# Patient Record
Sex: FEMALE | Race: WHITE | NOT HISPANIC OR LATINO | Employment: PART TIME | URBAN - METROPOLITAN AREA
[De-identification: names, ages, dates, MRNs, and addresses within clinical notes are randomized per-mention and may not be internally consistent; named-entity substitution may affect disease eponyms.]

---
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
## 2023-12-28 ENCOUNTER — OFFICE VISIT (OUTPATIENT)
Dept: URGENT CARE | Facility: CLINIC | Age: 18
End: 2023-12-28
Payer: MEDICAID

## 2023-12-28 VITALS
SYSTOLIC BLOOD PRESSURE: 118 MMHG | RESPIRATION RATE: 18 BRPM | HEIGHT: 62 IN | HEART RATE: 90 BPM | OXYGEN SATURATION: 100 % | BODY MASS INDEX: 23.92 KG/M2 | TEMPERATURE: 99 F | DIASTOLIC BLOOD PRESSURE: 76 MMHG | WEIGHT: 130 LBS

## 2023-12-28 DIAGNOSIS — U07.1 COVID: Primary | ICD-10-CM

## 2023-12-28 DIAGNOSIS — J00 ACUTE RHINITIS: ICD-10-CM

## 2023-12-28 LAB
CTP QC/QA: YES
MOLECULAR STREP A: NEGATIVE
POC MOLECULAR INFLUENZA A AGN: NEGATIVE
POC MOLECULAR INFLUENZA B AGN: NEGATIVE
SARS-COV-2 RDRP RESP QL NAA+PROBE: POSITIVE

## 2023-12-28 PROCEDURE — 99214 OFFICE O/P EST MOD 30 MIN: CPT | Mod: PBBFAC | Performed by: NURSE PRACTITIONER

## 2023-12-28 PROCEDURE — 87635 SARS-COV-2 COVID-19 AMP PRB: CPT | Mod: PBBFAC | Performed by: NURSE PRACTITIONER

## 2023-12-28 PROCEDURE — 87502 INFLUENZA DNA AMP PROBE: CPT | Mod: PBBFAC | Performed by: NURSE PRACTITIONER

## 2023-12-28 PROCEDURE — 99204 PR OFFICE/OUTPT VISIT, NEW, LEVL IV, 45-59 MIN: ICD-10-PCS | Mod: S$PBB,,, | Performed by: NURSE PRACTITIONER

## 2023-12-28 PROCEDURE — 99204 OFFICE O/P NEW MOD 45 MIN: CPT | Mod: S$PBB,,, | Performed by: NURSE PRACTITIONER

## 2023-12-28 PROCEDURE — 87651 STREP A DNA AMP PROBE: CPT | Mod: PBBFAC | Performed by: NURSE PRACTITIONER

## 2023-12-28 RX ORDER — FLUTICASONE PROPIONATE 50 MCG
2 SPRAY, SUSPENSION (ML) NASAL DAILY
Qty: 18.2 ML | Refills: 0 | Status: SHIPPED | OUTPATIENT
Start: 2023-12-28

## 2023-12-28 RX ORDER — LORATADINE 10 MG/1
10 TABLET ORAL DAILY
Qty: 30 TABLET | Refills: 0 | Status: SHIPPED | OUTPATIENT
Start: 2023-12-28 | End: 2024-01-27

## 2023-12-28 NOTE — PROGRESS NOTES
"Subjective:      Patient ID: Vasile Paredes is a 18 y.o. female.    Vitals:  height is 5' 2" (1.575 m) and weight is 59 kg (130 lb). Her oral temperature is 98.6 °F (37 °C). Her blood pressure is 118/76 and her pulse is 90. Her respiration is 18 and oxygen saturation is 100%.     Chief Complaint: flu like symptoms (Patient reports body aches, sore throat, congestion, runny nose, HA, chills x 2 days)    HPI As stated in chief complaint. Pt also c/o developing an intermittent cough today .  Patient denies taking any medication for treatment of symptoms.  Pt denies , chest pain, shortness of breath, dizziness, abdominal pain, n/v/d,    ROS   Objective:     Physical Exam   Constitutional: She is oriented to person, place, and time. She appears well-developed.  Non-toxic appearance. She does not appear ill. No distress.   HENT:   Head: Normocephalic and atraumatic.   Ears:   Right Ear: Tympanic membrane normal.   Left Ear: Tympanic membrane normal.   Nose: Rhinorrhea and congestion present. No purulent discharge. Right sinus exhibits no maxillary sinus tenderness and no frontal sinus tenderness. Left sinus exhibits no maxillary sinus tenderness and no frontal sinus tenderness.   Mouth/Throat: Uvula is midline. No oropharyngeal exudate or posterior oropharyngeal erythema.   Eyes: Conjunctivae are normal. Right eye exhibits no discharge. Left eye exhibits no discharge. Extraocular movement intact   Neck: Neck supple. No neck rigidity present.   Cardiovascular: Regular rhythm.   Pulmonary/Chest: Effort normal and breath sounds normal. No respiratory distress. She has no wheezes. She has no rales.   Abdominal: Normal appearance and bowel sounds are normal. Soft.   Lymphadenopathy:     She has no cervical adenopathy.   Neurological: She is alert and oriented to person, place, and time.   Skin: Skin is warm, dry and not diaphoretic. Capillary refill takes less than 2 seconds.   Psychiatric: Her behavior is normal. Mood, " judgment and thought content normal.   Nursing note and vitals reviewed.      Assessment:     1. COVID    2. Acute rhinitis      Results for orders placed or performed in visit on 12/28/23   POCT Strep A, Molecular   Result Value Ref Range    Molecular Strep A, POC Negative Negative     Acceptable Yes    POCT Influenza A/B MOLECULAR   Result Value Ref Range    POC Molecular Influenza A Ag Negative Negative, Not Reported    POC Molecular Influenza B Ag Negative Negative, Not Reported     Acceptable Yes    POCT COVID-19 Rapid Screening   Result Value Ref Range    POC Rapid COVID Positive (A) Negative     Acceptable Yes          Plan:   ER per cautions given and discussed also discussed complications of a respiratory illness such as COVID.  Instructed on symptomatic treatment with medications as prescribed and when to seek ER evaluation for worsening condition.  - OTC cold/flu products as desired for symptoms  - Plenty of fluids  - Humidified air- Saline Nasal  lavages   -Warm salt water gargles to your throat  - Tylenol or Motrin for pain/fever    I recommend a 7 day quarantine from day of symptom onset.   COVID is contagious for an average of EIGHT DAYS, starting from Day 1 that you have symptoms.  You can spread COVID 2-3 days before you have symptoms.  The CDC permits 5 days of quarantine. If you choose to quarantine for 5 days, wear a hospital-grade, surgical mask over your nose and mouth when around other people and in public through day 8.  A cloth mask provides no protection from spreading or angelina COVID.  COVID  -     fluticasone propionate (FLONASE) 50 mcg/actuation nasal spray; 2 sprays (100 mcg total) by Each Nostril route once daily.  Dispense: 18.2 mL; Refill: 0  -     loratadine (CLARITIN) 10 mg tablet; Take 1 tablet (10 mg total) by mouth once daily.  Dispense: 30 tablet; Refill: 0    Acute rhinitis  -     POCT Strep A, Molecular  -     POCT Influenza  A/B MOLECULAR  -     POCT COVID-19 Rapid Screening  -     fluticasone propionate (FLONASE) 50 mcg/actuation nasal spray; 2 sprays (100 mcg total) by Each Nostril route once daily.  Dispense: 18.2 mL; Refill: 0  -     loratadine (CLARITIN) 10 mg tablet; Take 1 tablet (10 mg total) by mouth once daily.  Dispense: 30 tablet; Refill: 0

## 2023-12-28 NOTE — LETTER
December 28, 2023      [facility]  [facility] [address]  Phone: [phone]       Patient: Vasile Paredes   YOB: 2005  Date of Visit: 12/28/2023    To Whom It May Concern:    Darby Paredes  was at [facility] on 12/28/2023. The patient may return to work/school on 01/02/23 with no restrictions. If you have any questions or concerns, or if I can be of further assistance, please do not hesitate to contact me.    Sincerely,    JERMAINE Busby

## 2023-12-28 NOTE — PATIENT INSTRUCTIONS
Please follow instructions on patient education material.      Return to urgent care in 2 to 3 days if symptoms are not improving, immediately if you develop any new or worsening symptoms.     - OTC cold/flu products as desired for symptoms  - Plenty of fluids  - Humidified air  - Saline Nasal  lavages   -Warm salt water gargles to your throat  - Tylenol or Motrin for pain/fever    I recommend a 7 day quarantine from day of symptom onset.   COVID is contagious for an average of EIGHT DAYS, starting from Day 1 that you have symptoms.  You can spread COVID 2-3 days before you have symptoms.  The [facility] permits 5 days of quarantine. If you choose to quarantine for 5 days, wear a hospital-grade, surgical mask over your nose and mouth when around other people and in public through day 8.  A cloth mask provides no protection from spreading or angelina COVID.    Go to the ER if you experience chest pain with shortness of breath, shortness of breath when moving around your house, high fevers 103.0+, excessive vomiting/diarrhea, or general distress.